# Patient Record
Sex: FEMALE | Race: OTHER | HISPANIC OR LATINO
[De-identification: names, ages, dates, MRNs, and addresses within clinical notes are randomized per-mention and may not be internally consistent; named-entity substitution may affect disease eponyms.]

---

## 2018-07-26 PROBLEM — Z00.00 ENCOUNTER FOR PREVENTIVE HEALTH EXAMINATION: Status: ACTIVE | Noted: 2018-07-26

## 2018-07-31 DIAGNOSIS — Z01.818 ENCOUNTER FOR OTHER PREPROCEDURAL EXAMINATION: ICD-10-CM

## 2018-08-13 ENCOUNTER — APPOINTMENT (OUTPATIENT)
Dept: SURGERY | Facility: CLINIC | Age: 23
End: 2018-08-13
Payer: COMMERCIAL

## 2018-08-13 VITALS
SYSTOLIC BLOOD PRESSURE: 117 MMHG | TEMPERATURE: 98.1 F | RESPIRATION RATE: 15 BRPM | OXYGEN SATURATION: 98 % | HEIGHT: 62 IN | HEART RATE: 60 BPM | BODY MASS INDEX: 48.68 KG/M2 | WEIGHT: 264.5 LBS | DIASTOLIC BLOOD PRESSURE: 74 MMHG

## 2018-08-13 DIAGNOSIS — M54.9 DORSALGIA, UNSPECIFIED: ICD-10-CM

## 2018-08-13 DIAGNOSIS — M25.569 PAIN IN UNSPECIFIED KNEE: ICD-10-CM

## 2018-08-13 DIAGNOSIS — Z87.09 PERSONAL HISTORY OF OTHER DISEASES OF THE RESPIRATORY SYSTEM: ICD-10-CM

## 2018-08-13 DIAGNOSIS — G56.00 CARPAL TUNNEL SYNDROME, UNSPECIFIED UPPER LIMB: ICD-10-CM

## 2018-08-13 DIAGNOSIS — Z78.9 OTHER SPECIFIED HEALTH STATUS: ICD-10-CM

## 2018-08-13 DIAGNOSIS — Z83.3 FAMILY HISTORY OF DIABETES MELLITUS: ICD-10-CM

## 2018-08-13 DIAGNOSIS — Z87.19 PERSONAL HISTORY OF OTHER DISEASES OF THE DIGESTIVE SYSTEM: ICD-10-CM

## 2018-08-13 DIAGNOSIS — K76.0 FATTY (CHANGE OF) LIVER, NOT ELSEWHERE CLASSIFIED: ICD-10-CM

## 2018-08-13 DIAGNOSIS — Z82.0 FAMILY HISTORY OF EPILEPSY AND OTHER DISEASES OF THE NERVOUS SYSTEM: ICD-10-CM

## 2018-08-13 PROCEDURE — 99205 OFFICE O/P NEW HI 60 MIN: CPT

## 2018-08-13 RX ORDER — PHENTERMINE HYDROCHLORIDE 15 MG/1
15 CAPSULE ORAL
Refills: 0 | Status: ACTIVE | COMMUNITY

## 2018-08-13 RX ORDER — MELOXICAM 15 MG/1
15 TABLET ORAL
Refills: 0 | Status: ACTIVE | COMMUNITY

## 2018-08-30 ENCOUNTER — APPOINTMENT (OUTPATIENT)
Dept: ULTRASOUND IMAGING | Facility: HOSPITAL | Age: 23
End: 2018-08-30

## 2018-08-30 ENCOUNTER — APPOINTMENT (OUTPATIENT)
Dept: RADIOLOGY | Facility: HOSPITAL | Age: 23
End: 2018-08-30

## 2018-10-22 ENCOUNTER — APPOINTMENT (OUTPATIENT)
Dept: ORTHOPEDIC SURGERY | Facility: CLINIC | Age: 23
End: 2018-10-22

## 2019-04-05 ENCOUNTER — APPOINTMENT (OUTPATIENT)
Dept: ORTHOPEDIC SURGERY | Facility: CLINIC | Age: 24
End: 2019-04-05
Payer: COMMERCIAL

## 2019-04-05 VITALS
DIASTOLIC BLOOD PRESSURE: 79 MMHG | HEIGHT: 62 IN | SYSTOLIC BLOOD PRESSURE: 133 MMHG | WEIGHT: 230 LBS | BODY MASS INDEX: 42.33 KG/M2 | HEART RATE: 63 BPM

## 2019-04-05 PROCEDURE — 72100 X-RAY EXAM L-S SPINE 2/3 VWS: CPT

## 2019-04-05 PROCEDURE — 72070 X-RAY EXAM THORAC SPINE 2VWS: CPT

## 2019-04-05 PROCEDURE — 99204 OFFICE O/P NEW MOD 45 MIN: CPT

## 2019-04-05 PROCEDURE — 72040 X-RAY EXAM NECK SPINE 2-3 VW: CPT

## 2019-04-05 RX ORDER — IBUPROFEN 800 MG/1
800 TABLET, FILM COATED ORAL
Qty: 90 | Refills: 0 | Status: ACTIVE | COMMUNITY
Start: 2019-04-05 | End: 1900-01-01

## 2019-04-05 NOTE — HISTORY OF PRESENT ILLNESS
[de-identified] : This 24 year old female started with diffuse back pain last summer. She has neck and upper back pain with some occasional radiation to the right arm that she grades as a 7, mid back pain without radiation anterior that she grades as an 8 or 9 and lower back pain with occasional radiation to the legs that she also grades as an 8 or 9. The pain is worse with sitting and driving but no worse standing or walking. She gets occasional mid and lower back pain at night. The pain is not worsened by coughing or sneezing but is occasionally worse forcing to move her bowels. Treatment to date has been power all and ibuprofen 400 mg once or twice a day. She does have some daily habits that may well aggravate and propagate neck related pain. [Pain Location] : pain [9] : a current pain level of 9/10 [Sitting] : sitting

## 2019-04-05 NOTE — DISCUSSION/SUMMARY
[Medication Risks Reviewed] : Medication risks reviewed [de-identified] : She will use moist heat and we discussed habits that she needs to change which may be aggravating her neck and upper back pain and she has been started on ibuprofen 800 mg 3 times a day. I will see her for followup in 4 weeks. She will call if there are problems with the medication.

## 2019-04-05 NOTE — REVIEW OF SYSTEMS
[Wheezing] : wheezing [Negative] : Gastrointestinal [FreeTextEntry5] : while the chart describes chronic reflux symptoms the patient adamantly denies any current upper GI symptoms

## 2019-04-05 NOTE — PHYSICAL EXAM
[de-identified] : She is fully alert and oriented with normal mood and affect. She is morbidly obese. She is in no acute distress. She ambulates with a normal gait including tiptoe and heel walking. There is no evidence of unsteadiness or spasticity. There are no cutaneous abnormalities were palpable bony defects of the spine. There is no evidence of shortness of breath or respiratory distress. There is no paravertebral muscle spasm or trochanteric tenderness. There is mild bilateral sciatic notch sensitivity. Forward flexion of the spine shows the fingertips reaching to within 6 inches of the floor causing lower back discomfort. Her lower extremity neurological examination revealed 1+ symmetrical reflexes with normal motor power and sensation. Straight leg raising is negative to 90°. Vascular exam shows no evidence of varicosities and there is no lymphedema. Her knees have a full range of motion with normal stability. There are no cutaneous abnormalities of the upper or lower extremities. Her shoulder range of motion is full and symmetrical. Range of motion of the cervical spine shows flexion with the chin reaching to within 2 fingerbreadths of the chest. Extension is to 80° with rotation and 90° bilaterally and tilt to 40° bilaterally without apparent pain. Her upper extremity neurological examination revealed 1+ symmetrical reflexes with normal motor power and sensation. [de-identified] : AP and lateral x-rays of the cervical spine revealed normal sagittal alignment. There are no destructive changes. AP and lateral x-rays of the thoracic spine shows minimal degenerative change and there are no destructive changes. AP lateral x-rays of the lumbar spine reveal normal sagittal alignment and no destructive changes.

## 2019-05-03 ENCOUNTER — APPOINTMENT (OUTPATIENT)
Dept: ORTHOPEDIC SURGERY | Facility: CLINIC | Age: 24
End: 2019-05-03
Payer: COMMERCIAL

## 2019-05-03 DIAGNOSIS — M54.42 LUMBAGO WITH SCIATICA, LEFT SIDE: ICD-10-CM

## 2019-05-03 DIAGNOSIS — M54.9 DORSALGIA, UNSPECIFIED: ICD-10-CM

## 2019-05-03 DIAGNOSIS — E66.01 MORBID (SEVERE) OBESITY DUE TO EXCESS CALORIES: ICD-10-CM

## 2019-05-03 DIAGNOSIS — M54.2 CERVICALGIA: ICD-10-CM

## 2019-05-03 DIAGNOSIS — G89.29 DORSALGIA, UNSPECIFIED: ICD-10-CM

## 2019-05-03 DIAGNOSIS — G89.29 LUMBAGO WITH SCIATICA, LEFT SIDE: ICD-10-CM

## 2019-05-03 DIAGNOSIS — M54.41 LUMBAGO WITH SCIATICA, LEFT SIDE: ICD-10-CM

## 2019-05-03 PROCEDURE — 99214 OFFICE O/P EST MOD 30 MIN: CPT

## 2019-05-03 RX ORDER — IBUPROFEN 800 MG/1
800 TABLET, FILM COATED ORAL
Qty: 75 | Refills: 0 | Status: ACTIVE | COMMUNITY
Start: 2019-05-03 | End: 1900-01-01

## 2019-05-03 NOTE — DISCUSSION/SUMMARY
[de-identified] : I have again explained that the ibuprofen is not prescribed as a pain medicine but as an anti-inflammatory medication and he needs to be taken 3 times a day. I will see her for followup in 4 weeks [Medication Risks Reviewed] : Medication risks reviewed

## 2019-05-03 NOTE — HISTORY OF PRESENT ILLNESS
[de-identified] : She was seen last month with 5-6 months of diffuse back pain. Ibuprofen 800 mg 3 times a day was prescribed. She has been taking it only once a day and she has not seen any improvement in her neck and upper back, her mid back or her lower back pain.

## 2020-03-13 ENCOUNTER — APPOINTMENT (OUTPATIENT)
Dept: ORTHOPEDIC SURGERY | Facility: CLINIC | Age: 25
End: 2020-03-13

## 2022-12-05 ENCOUNTER — EMERGENCY (EMERGENCY)
Facility: HOSPITAL | Age: 27
LOS: 1 days | Discharge: HOME | End: 2022-12-05
Attending: EMERGENCY MEDICINE | Admitting: EMERGENCY MEDICINE

## 2022-12-05 VITALS
TEMPERATURE: 98 F | DIASTOLIC BLOOD PRESSURE: 60 MMHG | HEART RATE: 85 BPM | SYSTOLIC BLOOD PRESSURE: 136 MMHG | RESPIRATION RATE: 16 BRPM | OXYGEN SATURATION: 99 %

## 2022-12-05 PROCEDURE — 99284 EMERGENCY DEPT VISIT MOD MDM: CPT

## 2022-12-05 RX ORDER — METOCLOPRAMIDE HCL 10 MG
10 TABLET ORAL ONCE
Refills: 0 | Status: COMPLETED | OUTPATIENT
Start: 2022-12-05 | End: 2022-12-05

## 2022-12-05 RX ORDER — ONDANSETRON 8 MG/1
4 TABLET, FILM COATED ORAL ONCE
Refills: 0 | Status: COMPLETED | OUTPATIENT
Start: 2022-12-05 | End: 2022-12-05

## 2022-12-05 RX ORDER — ONDANSETRON 8 MG/1
1 TABLET, FILM COATED ORAL
Qty: 15 | Refills: 0
Start: 2022-12-05

## 2022-12-05 RX ORDER — LOPERAMIDE HCL 2 MG
1 TABLET ORAL
Qty: 15 | Refills: 0
Start: 2022-12-05 | End: 2022-12-09

## 2022-12-05 RX ORDER — METOCLOPRAMIDE HCL 10 MG
1 TABLET ORAL
Qty: 15 | Refills: 0
Start: 2022-12-05

## 2022-12-05 RX ADMIN — Medication 10 MILLIGRAM(S): at 16:59

## 2022-12-05 RX ADMIN — ONDANSETRON 4 MILLIGRAM(S): 8 TABLET, FILM COATED ORAL at 16:59

## 2022-12-05 NOTE — ED ADULT TRIAGE NOTE - CHIEF COMPLAINT QUOTE
c/o nausea/vomiting/diarrhea and abdominal cramping after eating kanika bread with chicken last night.

## 2022-12-05 NOTE — ED PROVIDER NOTE - NSFOLLOWUPINSTRUCTIONS_ED_ALL_ED_FT
Take medications as prescribed for: food poisoning.    Tylenol 500 mg (1 or 2 every 6 hours) and/or naproxen 500 mg (1 every 12 hours) for pain.     Zofran and/or Reglan for nausea.    Imodium for diarrhea.    Try to stay hydrated: Gatorade or soup are good options. Return if unable to hold down liquid or solid food.    Return if evidence of dehydration: (eg, persistently-decreased urine output, dry mouth).

## 2022-12-05 NOTE — ED ADULT TRIAGE NOTE - NS ED NURSE BANDS TYPE
[Excellent] : ~his/her~  mood as  excellent [Never] : Never [Yes] : Yes [No falls in past year] : Patient reported no falls in the past year [0] : 2) Feeling down, depressed, or hopeless: Not at all (0) [PHQ-2 Negative - No further assessment needed] : PHQ-2 Negative - No further assessment needed [[id number]] : 0 [Patient reported colonoscopy was normal] : Patient reported colonoscopy was normal [HIV Test offered] : HIV Test offered [Hepatitis C test offered] : Hepatitis C test offered Name band; [None] : None [With Significant Other] : lives with significant other [Employed] : employed [] :  [# Of Children ___] : has [unfilled] children [Feels Safe at Home] : Feels safe at home [Fully functional (bathing, dressing, toileting, transferring, walking, feeding)] : Fully functional (bathing, dressing, toileting, transferring, walking, feeding) [Fully functional (using the telephone, shopping, preparing meals, housekeeping, doing laundry, using] : Fully functional and needs no help or supervision to perform IADLs (using the telephone, shopping, preparing meals, housekeeping, doing laundry, using transportation, managing medications and managing finances) [Smoke Detector] : smoke detector [Seat Belt] :  uses seat belt [ColonoscopyDate] : 3/24/2016 [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

## 2022-12-05 NOTE — ED PROVIDER NOTE - OBJECTIVE STATEMENT
Vianca: Last night, ate a chicken and the role from a pizza store.  In the middle of the night, developed vomiting, diarrhea, chills.  No recent travel.  No sick contacts.  Has been drinking water.  Has a mild headache.  Feels stomach cramps.  Feels fatigued.  No allergies.  No significant past medical history.  Failed Pepto-Bismol.  No one else ate the same food that she did. Vianca: Last night, ate a chicken and the role from a pizza store.  In the middle of the night, developed vomiting, diarrhea, chills.  No recent travel.  No sick contacts.  Has been drinking water.  Has a mild headache.  Feels stomach cramps.  Feels fatigued.  No allergies.  No significant past medical history.  Failed Pepto-Bismol.  No one else ate the same food that she did.  No pregnancy S/Sx or missed period.

## 2022-12-05 NOTE — ED PROVIDER NOTE - PATIENT PORTAL LINK FT
You can access the FollowMyHealth Patient Portal offered by [facility] by registering at the following website: [email or web address]. By joining [facility]’s FollowMyHealth portal, you will also be able to view your health information using other applications (apps) compatible with our system.

## 2022-12-05 NOTE — ED PROVIDER NOTE - NSCAREINITIATED _GEN_ER
[Dear  ___] : Dear  [unfilled], [Consult Letter:] : I had the pleasure of evaluating your patient, [unfilled]. [Please see my note below.] : Please see my note below. [Consult Closing:] : Thank you very much for allowing me to participate in the care of this patient.  If you have any questions, please do not hesitate to contact me. [Sincerely,] : Sincerely, [FreeTextEntry2] : Dr. Gilbert [FreeTextEntry3] : Ursula Josue MD\par Medical Geneticist\par  [place] Coley(Attending)

## 2022-12-05 NOTE — ED PROVIDER NOTE - CLINICAL SUMMARY MEDICAL DECISION MAKING FREE TEXT BOX
Vianca: This is likely food poisoning.  Will give Zofran and Reglan orally.  Attempt PO challenge.  Likely discharge on same.  Add Imodium.

## 2024-06-08 ENCOUNTER — NON-APPOINTMENT (OUTPATIENT)
Age: 29
End: 2024-06-08